# Patient Record
Sex: FEMALE | Race: BLACK OR AFRICAN AMERICAN | Employment: STUDENT | URBAN - METROPOLITAN AREA
[De-identification: names, ages, dates, MRNs, and addresses within clinical notes are randomized per-mention and may not be internally consistent; named-entity substitution may affect disease eponyms.]

---

## 2017-07-06 DIAGNOSIS — L70.0 ACNE VULGARIS: ICD-10-CM

## 2017-07-07 RX ORDER — TAZAROTENE 0.05 MG/G
CREAM CUTANEOUS
Refills: 0 | OUTPATIENT
Start: 2017-07-07

## 2017-09-05 ENCOUNTER — TELEPHONE (OUTPATIENT)
Dept: RHEUMATOLOGY | Facility: CLINIC | Age: 22
End: 2017-09-05

## 2017-09-05 NOTE — TELEPHONE ENCOUNTER
Spoke with patient and she stated that she would like to go to a clinic closer to her home. Sariah Flores CMA  9/5/2017 10:20 AM